# Patient Record
Sex: FEMALE | Race: WHITE
[De-identification: names, ages, dates, MRNs, and addresses within clinical notes are randomized per-mention and may not be internally consistent; named-entity substitution may affect disease eponyms.]

---

## 2018-03-08 ENCOUNTER — HOSPITAL ENCOUNTER (OUTPATIENT)
Dept: HOSPITAL 93 - LAB | Age: 48
Discharge: HOME | End: 2018-03-08
Attending: INTERNAL MEDICINE
Payer: COMMERCIAL

## 2018-03-08 DIAGNOSIS — I10: ICD-10-CM

## 2018-03-08 DIAGNOSIS — D50.8: ICD-10-CM

## 2018-03-08 DIAGNOSIS — D51.8: ICD-10-CM

## 2018-03-08 DIAGNOSIS — D47.2: Primary | ICD-10-CM

## 2019-02-11 ENCOUNTER — HOSPITAL ENCOUNTER (OUTPATIENT)
Dept: HOSPITAL 93 - AMB-ENDOS | Age: 49
Discharge: HOME | End: 2019-02-11
Attending: COLON & RECTAL SURGERY
Payer: COMMERCIAL

## 2019-02-11 DIAGNOSIS — C18.7: Primary | ICD-10-CM

## 2019-03-11 ENCOUNTER — HOSPITAL ENCOUNTER (INPATIENT)
Dept: HOSPITAL 93 - SURH | Age: 49
LOS: 9 days | Discharge: HOME | DRG: 330 | End: 2019-03-20
Attending: COLON & RECTAL SURGERY | Admitting: COLON & RECTAL SURGERY
Payer: COMMERCIAL

## 2019-03-11 VITALS — HEIGHT: 62 IN | WEIGHT: 185 LBS | BODY MASS INDEX: 34.04 KG/M2

## 2019-03-11 DIAGNOSIS — I11.9: ICD-10-CM

## 2019-03-11 DIAGNOSIS — C18.7: Primary | ICD-10-CM

## 2019-03-11 DIAGNOSIS — J98.11: ICD-10-CM

## 2019-03-11 DIAGNOSIS — D47.2: ICD-10-CM

## 2019-03-11 DIAGNOSIS — R59.0: ICD-10-CM

## 2019-03-11 DIAGNOSIS — J90: ICD-10-CM

## 2019-03-11 DIAGNOSIS — R09.02: ICD-10-CM

## 2019-03-11 DIAGNOSIS — I73.89: ICD-10-CM

## 2019-03-12 PROCEDURE — 0DJD8ZZ INSPECTION OF LOWER INTESTINAL TRACT, VIA NATURAL OR ARTIFICIAL OPENING ENDOSCOPIC: ICD-10-PCS | Performed by: COLON & RECTAL SURGERY

## 2019-03-12 PROCEDURE — 0DTN4ZZ RESECTION OF SIGMOID COLON, PERCUTANEOUS ENDOSCOPIC APPROACH: ICD-10-PCS | Performed by: COLON & RECTAL SURGERY

## 2019-03-12 PROCEDURE — 0T788DZ DILATION OF BILATERAL URETERS WITH INTRALUMINAL DEVICE, VIA NATURAL OR ARTIFICIAL OPENING ENDOSCOPIC: ICD-10-PCS | Performed by: UROLOGY

## 2019-03-12 PROCEDURE — 07TC4ZZ RESECTION OF PELVIS LYMPHATIC, PERCUTANEOUS ENDOSCOPIC APPROACH: ICD-10-PCS | Performed by: COLON & RECTAL SURGERY

## 2019-03-14 PROCEDURE — 4A12X4Z MONITORING OF CARDIAC ELECTRICAL ACTIVITY, EXTERNAL APPROACH: ICD-10-PCS | Performed by: COLON & RECTAL SURGERY

## 2019-03-14 PROCEDURE — 4A033R1 MEASUREMENT OF ARTERIAL SATURATION, PERIPHERAL, PERCUTANEOUS APPROACH: ICD-10-PCS | Performed by: COLON & RECTAL SURGERY

## 2019-03-15 PROCEDURE — BW24ZZZ COMPUTERIZED TOMOGRAPHY (CT SCAN) OF CHEST AND ABDOMEN: ICD-10-PCS | Performed by: COLON & RECTAL SURGERY

## 2019-03-15 PROCEDURE — 3E0F7GC INTRODUCTION OF OTHER THERAPEUTIC SUBSTANCE INTO RESPIRATORY TRACT, VIA NATURAL OR ARTIFICIAL OPENING: ICD-10-PCS | Performed by: COLON & RECTAL SURGERY

## 2019-03-27 ENCOUNTER — HOSPITAL ENCOUNTER (EMERGENCY)
Dept: HOSPITAL 93 - ER | Age: 49
LOS: 1 days | Discharge: HOME | End: 2019-03-28
Payer: COMMERCIAL

## 2019-03-27 VITALS — BODY MASS INDEX: 32.76 KG/M2 | WEIGHT: 178 LBS | HEIGHT: 62 IN

## 2019-03-27 DIAGNOSIS — R91.1: ICD-10-CM

## 2019-03-27 DIAGNOSIS — K62.89: Primary | ICD-10-CM

## 2019-03-27 DIAGNOSIS — R06.02: ICD-10-CM

## 2019-04-10 ENCOUNTER — HOSPITAL ENCOUNTER (INPATIENT)
Dept: HOSPITAL 93 - ER | Age: 49
LOS: 15 days | Discharge: HOME | DRG: 372 | End: 2019-04-25
Attending: COLON & RECTAL SURGERY | Admitting: COLON & RECTAL SURGERY
Payer: COMMERCIAL

## 2019-04-10 VITALS — HEIGHT: 62 IN | BODY MASS INDEX: 32.02 KG/M2 | WEIGHT: 174 LBS

## 2019-04-10 DIAGNOSIS — E83.59: ICD-10-CM

## 2019-04-10 DIAGNOSIS — C18.7: ICD-10-CM

## 2019-04-10 DIAGNOSIS — E66.8: ICD-10-CM

## 2019-04-10 DIAGNOSIS — K21.9: ICD-10-CM

## 2019-04-10 DIAGNOSIS — K65.1: Primary | ICD-10-CM

## 2019-04-10 DIAGNOSIS — I11.9: ICD-10-CM

## 2019-04-10 DIAGNOSIS — R63.0: ICD-10-CM

## 2019-04-10 DIAGNOSIS — I73.89: ICD-10-CM

## 2019-04-10 DIAGNOSIS — K62.89: ICD-10-CM

## 2019-04-10 DIAGNOSIS — N29: ICD-10-CM

## 2019-04-10 DIAGNOSIS — D63.0: ICD-10-CM

## 2019-04-10 DIAGNOSIS — D47.2: ICD-10-CM

## 2019-04-11 PROCEDURE — BW21ZZZ COMPUTERIZED TOMOGRAPHY (CT SCAN) OF ABDOMEN AND PELVIS: ICD-10-PCS | Performed by: COLON & RECTAL SURGERY

## 2019-04-12 PROCEDURE — 0W9F3ZZ DRAINAGE OF ABDOMINAL WALL, PERCUTANEOUS APPROACH: ICD-10-PCS | Performed by: RADIOLOGY

## 2019-04-13 PROCEDURE — 02HV33Z INSERTION OF INFUSION DEVICE INTO SUPERIOR VENA CAVA, PERCUTANEOUS APPROACH: ICD-10-PCS | Performed by: COLON & RECTAL SURGERY

## 2019-04-17 PROCEDURE — B246ZZZ ULTRASONOGRAPHY OF RIGHT AND LEFT HEART: ICD-10-PCS | Performed by: COLON & RECTAL SURGERY

## 2019-09-17 ENCOUNTER — HOSPITAL ENCOUNTER (OUTPATIENT)
Dept: HOSPITAL 93 - CIR.AMB | Age: 49
Discharge: HOME | End: 2019-09-17
Attending: SPECIALIST
Payer: COMMERCIAL

## 2019-09-17 DIAGNOSIS — C18.8: Primary | ICD-10-CM

## 2019-09-17 PROCEDURE — 36561 INSERT TUNNELED CV CATH: CPT

## 2020-04-22 ENCOUNTER — HOSPITAL ENCOUNTER (INPATIENT)
Dept: HOSPITAL 93 - ER | Age: 50
LOS: 2 days | Discharge: HOME | DRG: 253 | End: 2020-04-24
Attending: SPECIALIST | Admitting: SPECIALIST
Payer: COMMERCIAL

## 2020-04-22 VITALS — WEIGHT: 150 LBS | HEIGHT: 62 IN | BODY MASS INDEX: 27.6 KG/M2

## 2020-04-22 DIAGNOSIS — T82.868A: Primary | ICD-10-CM

## 2020-04-22 DIAGNOSIS — C18.8: ICD-10-CM
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
## 2020-04-22 DIAGNOSIS — I11.9: ICD-10-CM
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
## 2020-04-22 PROCEDURE — 8E0ZXY6 ISOLATION: ICD-10-PCS | Performed by: SPECIALIST

## 2020-04-22 NOTE — NUR
SE RECIBE PACIENTE ALERTA Y ORINETADA REFIERE TENER DOLOR EN BRAZO ASHWINI
INDICA LA ENVIA A KEEGAN DE EMERGENCIA EL DR. SEGOVIA PARA ADMITIR PACIENTE REFIERE
TENR CUAGULO . INIDICA LE REMOVERA EL MEDPORT. SE PRESENTA A DR. LECHUGA Y
SE REALIZA EKG.

## 2020-04-22 NOTE — NUR
PACIENTE ALERTA Y ORIENTADA X3. SE ORIENTA SOBRE PROCEDIMIENTO A REALIZAR Y
REFIERE ENTENDER. SE REALIZA MUESTRAS DE LABORATORIO BAJO MEDIDAS ASEPTICAS Y
CANALIZACION. PENDIENTE PLACA ORDENADA POR MD. SE MANTIENE BAJO OBSERVACION POR
CAMBIOS SIGNIFICATIVOS. PENDIENTE MUESTRA DE U/A, PACIENTE CON ENVASE.

## 2020-04-23 PROCEDURE — 05PY03Z REMOVAL OF INFUSION DEVICE FROM UPPER VEIN, OPEN APPROACH: ICD-10-PCS | Performed by: SPECIALIST

## 2020-04-23 PROCEDURE — 0JPV0WZ REMOVAL OF TOTALLY IMPLANTABLE VASCULAR ACCESS DEVICE FROM UPPER EXTREMITY SUBCUTANEOUS TISSUE AND FASCIA, OPEN APPROACH: ICD-10-PCS | Performed by: SPECIALIST
